# Patient Record
Sex: FEMALE | URBAN - METROPOLITAN AREA
[De-identification: names, ages, dates, MRNs, and addresses within clinical notes are randomized per-mention and may not be internally consistent; named-entity substitution may affect disease eponyms.]

---

## 2023-11-16 ENCOUNTER — OFFICE VISIT (OUTPATIENT)
Dept: URBAN - METROPOLITAN AREA CLINIC 80 | Facility: CLINIC | Age: 63
End: 2023-11-16

## 2023-11-29 ENCOUNTER — OUT OF OFFICE VISIT (OUTPATIENT)
Dept: URBAN - METROPOLITAN AREA SURGERY CENTER 19 | Facility: SURGERY CENTER | Age: 63
End: 2023-11-29
Payer: OTHER GOVERNMENT

## 2023-11-29 DIAGNOSIS — Z12.11 ENCOUNTER FOR SCREENING FOR MALIGNANT NEOPLASM OF COLON: ICD-10-CM

## 2023-11-29 DIAGNOSIS — Z12.11 COLON CANCER SCREENING: ICD-10-CM

## 2023-11-29 PROCEDURE — G8907 PT DOC NO EVENTS ON DISCHARG: HCPCS | Performed by: INTERNAL MEDICINE

## 2023-11-29 PROCEDURE — 00812 ANES LWR INTST SCR COLSC: CPT | Performed by: NURSE ANESTHETIST, CERTIFIED REGISTERED

## 2023-11-29 PROCEDURE — 45378 DIAGNOSTIC COLONOSCOPY: CPT | Performed by: INTERNAL MEDICINE

## 2025-06-27 ENCOUNTER — OFFICE VISIT (OUTPATIENT)
Dept: URBAN - METROPOLITAN AREA CLINIC 2 | Facility: CLINIC | Age: 65
End: 2025-06-27

## 2025-06-27 ENCOUNTER — OFFICE VISIT (OUTPATIENT)
Dept: URBAN - METROPOLITAN AREA CLINIC 2 | Facility: CLINIC | Age: 65
End: 2025-06-27
Payer: MEDICARE

## 2025-06-27 ENCOUNTER — LAB OUTSIDE AN ENCOUNTER (OUTPATIENT)
Dept: URBAN - METROPOLITAN AREA CLINIC 2 | Facility: CLINIC | Age: 65
End: 2025-06-27

## 2025-06-27 ENCOUNTER — DASHBOARD ENCOUNTERS (OUTPATIENT)
Age: 65
End: 2025-06-27

## 2025-06-27 DIAGNOSIS — R10.11 RUQ PAIN: ICD-10-CM

## 2025-06-27 DIAGNOSIS — D13.5 ADENOMYOMATOSIS OF GALLBLADDER: ICD-10-CM

## 2025-06-27 DIAGNOSIS — K76.9 LIVER LESION: ICD-10-CM

## 2025-06-27 PROBLEM — 300331000: Status: ACTIVE | Noted: 2025-06-27

## 2025-06-27 PROCEDURE — 99243 OFF/OP CNSLTJ NEW/EST LOW 30: CPT | Performed by: NURSE PRACTITIONER

## 2025-06-27 RX ORDER — TRAZODONE HYDROCHLORIDE 100 MG/1
TABLET ORAL
Qty: 90 TABLET | Status: ACTIVE | COMMUNITY

## 2025-06-27 RX ORDER — LEVOTHYROXINE SODIUM 0.2 MG/1
TABLET ORAL
Qty: 90 TABLET | Status: ACTIVE | COMMUNITY

## 2025-06-27 RX ORDER — DICLOFENAC SODIUM 100 MG/1
TABLET, FILM COATED, EXTENDED RELEASE ORAL
Qty: 30 TABLET | Status: ACTIVE | COMMUNITY

## 2025-06-27 RX ORDER — AMOXICILLIN 500 MG/1
CAPSULE ORAL
Qty: 21 CAPSULE | Status: ACTIVE | COMMUNITY

## 2025-06-27 RX ORDER — FENOFIBRATE 48 MG/1
TABLET ORAL
Qty: 90 TABLET | Status: ACTIVE | COMMUNITY

## 2025-06-27 RX ORDER — FAMOTIDINE 20 MG/1
1 TABLET AT BEDTIME AS NEEDED TABLET, FILM COATED ORAL ONCE A DAY
Qty: 90 | OUTPATIENT
Start: 2025-06-27

## 2025-06-27 NOTE — HPI-ZZZTODAY'S VISIT
Ms. Ramos is a 65-year-old female patient seen today on referral from Dr. Yaquelin Singletary for right upper quadrant pain. Symptoms began about 1 year ago when she was playing pickle ball.  She had pain in her right upper quadrant.  Since then pain has been dull but constant.  Usually flares with turning or twisting.  Can be tender to palpate but not now.  No change with oral intake or with bowel movement.  She mentioned pain at her physical with her primary care so she was referred for evaluation.  Patient complains of GERD.  Sometimes GERD will cause nausea and vomiting.  She does not take medication regularly.  She will take famotidine if needed. Prior evaluation includes a right upper quadrant ultrasound that was notable for 3.4 x 0.6 x 1.8 echogenic structure in the right liver.  Recommended to consider CT if clinically indicated.  Gallbladder noted adenomyomatosis.   She did have a colonoscopy in 2023 that was normal.

## 2025-07-24 ENCOUNTER — P2P PATIENT RECORD (OUTPATIENT)
Age: 65
End: 2025-07-24